# Patient Record
(demographics unavailable — no encounter records)

---

## 2024-11-22 NOTE — HISTORY OF PRESENT ILLNESS
[N] : Patient does not use contraception [Y] : Positive pregnancy history [Menarche Age: ____] : age at menarche was [unfilled] [No] : Patient does not have concerns regarding sex [Currently Active] : currently active [Men] : men [GonorrheaDate] : 10/18/2023 [TextBox_63] : neg [ChlamydiaDate] : 10/18/2023 [TextBox_68] : neg [LMPDate] : 10/3/2024 [PGHxTotal] : 4 [[facility] Center[facility] Center[facility]Term] : 3 [[facility] Phoenixiving] : 3 [PGHxABInduced] : 1 [FreeTextEntry1] : 10/3/2024

## 2024-11-22 NOTE — PHYSICAL EXAM
[Chaperone Present] : A chaperone was present in the examining room during all aspects of the physical examination [[id number]] : A chaperone was present during the pelvic exam. [Appropriately responsive] : appropriately responsive [Alert] : alert [No Acute Distress] : no acute distress [Oriented x3] : oriented x3 [Labia Majora] : normal [Labia Minora] : normal [Labia Minora Erythema] : erythema [Discharge] : a  ~M vaginal discharge was present [Moderate] : moderate [Mathew] : yellow [Cheesy] : cheesy [No Bleeding] : There was no active vaginal bleeding [Normal] : normal [Uterine Adnexae] : non-palpable [FreeTextEntry2] : JENELLE Weber

## 2024-11-22 NOTE — PLAN
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

## 2024-12-04 NOTE — PHYSICAL EXAM
[Chaperone Present] : A chaperone was present in the examining room during all aspects of the physical examination [[id number]] : A chaperone was present during the pelvic exam. [Appropriately responsive] : appropriately responsive [Alert] : alert [No Acute Distress] : no acute distress [Soft] : soft [Non-tender] : non-tender [Non-distended] : non-distended [No Mass] : no mass [Oriented x3] : oriented x3 [Examination Of The Breasts] : a normal appearance [] : multiple nodules were palpated [No Masses] : no breast masses were palpable [Labia Majora] : normal [Labia Minora] : normal [No Bleeding] : There was no active vaginal bleeding [Normal] : normal [Uterine Adnexae] : non-palpable [FreeTextEntry2] : JENELLE Malave [Tenderness] : nontender

## 2024-12-04 NOTE — REVIEW OF SYSTEMS
[Breast Pain] : breast pain [Breast Lump] : breast lump [Nipple Discharge] : no nipple discharge [Skin Lesion on Breast] : no skin lesion on breast [FreeTextEntry8] : pelvic pain with sex, recurrent vaginitis  [de-identified] : left lateral breast tenderness, "ball" with periods

## 2024-12-04 NOTE — PLAN
[FreeTextEntry1] : -F/u pap -Discussed diet and lifestyle management to reduce risk of worsening pre-diabetes, encouraged f/u with PCP, due for physical 1/2025 -Reviewed good vulvovaginal hygiene habits, possibility of long-term Fluconazole treatment if symptoms continue to be recurrent  -Rx mammogram/BUS, evaluate nodular tissue; discussed possibly cystic as pain occurs with menstrual period  -RTO for TVUS for pelvic pain with intercourse  -Routine physical activity encouraged -Call or RTO PRN for any new problems, questions or concerns

## 2024-12-04 NOTE — REASON FOR VISIT
[Annual] : an annual visit. [[employer] ] : provided by [employer]   [Interpreters_IDNumber] : [id number] [Interpreters_FullName] : Praveen

## 2024-12-04 NOTE — HISTORY OF PRESENT ILLNESS
[Y] : Positive pregnancy history [Menarche Age: ____] : age at menarche was [unfilled] [Patient reported mammogram was normal] : Patient reported mammogram was normal [No] : Patient does not have concerns regarding sex [Previously active] : previously active [Men] : men [Mammogramdate] : 2022 [TextBox_19] : Unsure where MAMMO was done  [PapSmeardate] : 2022 [TextBox_31] : Wnl as per patient  [LMPDate] : 11/30/24 [PGHxTotal] : 4 [[facility]FullTerm] : 4 [[facility] Hospitaliving] : 3 [FreeTextEntry1] : 11/30/24 [FreeTextEntry2] :

## 2025-01-24 NOTE — PHYSICAL EXAM
[Chaperone Present] : A chaperone was present in the examining room during all aspects of the physical examination [[id number]] : A chaperone was present during the pelvic exam. [Appropriately responsive] : appropriately responsive [Alert] : alert [No Acute Distress] : no acute distress [Oriented x3] : oriented x3 [Labia Majora] : normal [Labia Minora] : normal [Normal] : normal

## 2025-01-24 NOTE — DISCUSSION/SUMMARY
[FreeTextEntry1] : 45 y/o P3 LMP 1/21 with pelvic pain, small dermoid cyst, recurrent vaginitis, and symptoms possibly related to perimenopause but are overall non-specific  #Recurrent vaginitis - Vaginitis panel sent today to check for current infection - Given both BV and candida recurrence in past, recommended vaginal boric acid for prevention. Recommended nightly use for one month followed by weekly after that. Script sent to pharmacy today - Lotrisone also prescribed at patient's request as it has helped with external irritation prior. Reviewed only to use on vulva and not to insert into vagina.  #Pelvic pain - Reviewed no obvious cause of pain on ultrasound today (3.4 cm dermoid unlikely to cause pain). Reviewed however that pain does seem to be uterine in nature based on timing and description. Reviewed treatment options to control menses, and OCPs were selected. - Lo Loestrin prescribed, possible side effects reviewed.  #Possible perimenopausal symptoms - Reviewed that symptoms could be related to perimenopause, however, FSH testing will not help diagnose this unless patient is very near menopause and with menses being regular still this is unlikely - Discussed trial of E2 replacement could be informative as if it helps symptoms then it is likely that perimenopause is the cause. Discussed that combined OCPs being given to help with menses would assist with this as well, pt expressed understanding and is interested in trialing OCPs for this purposes as well  #Demoid cyst - Reviewed cyst, likely benign nature, small in size - F/u sono in 3 months  - Return in 3 months to check on OCP success and to f/u on sono  Earnest Porter MD

## 2025-01-24 NOTE — PHYSICAL EXAM
[Chaperone Present] : A chaperone was present in the examining room during all aspects of the physical examination [[id number]] : A chaperone was present during the pelvic exam. [Appropriately responsive] : appropriately responsive [Alert] : alert [No Acute Distress] : no acute distress [Oriented x3] : oriented x3 [Labia Majora] : normal [Labia Minora] : normal [Normal] : normal

## 2025-01-24 NOTE — DISCUSSION/SUMMARY
[FreeTextEntry1] : 43 y/o P3 LMP 1/21 with pelvic pain, small dermoid cyst, recurrent vaginitis, and symptoms possibly related to perimenopause but are overall non-specific  #Recurrent vaginitis - Vaginitis panel sent today to check for current infection - Given both BV and candida recurrence in past, recommended vaginal boric acid for prevention. Recommended nightly use for one month followed by weekly after that. Script sent to pharmacy today - Lotrisone also prescribed at patient's request as it has helped with external irritation prior. Reviewed only to use on vulva and not to insert into vagina.  #Pelvic pain - Reviewed no obvious cause of pain on ultrasound today (3.4 cm dermoid unlikely to cause pain). Reviewed however that pain does seem to be uterine in nature based on timing and description. Reviewed treatment options to control menses, and OCPs were selected. - Lo Loestrin prescribed, possible side effects reviewed.  #Possible perimenopausal symptoms - Reviewed that symptoms could be related to perimenopause, however, FSH testing will not help diagnose this unless patient is very near menopause and with menses being regular still this is unlikely - Discussed trial of E2 replacement could be informative as if it helps symptoms then it is likely that perimenopause is the cause. Discussed that combined OCPs being given to help with menses would assist with this as well, pt expressed understanding and is interested in trialing OCPs for this purposes as well  #Demoid cyst - Reviewed cyst, likely benign nature, small in size - F/u sono in 3 months  - Return in 3 months to check on OCP success and to f/u on sono  Earnest Porter MD

## 2025-01-24 NOTE — HISTORY OF PRESENT ILLNESS
[N] : Patient does not use contraception [Y] : Positive pregnancy history [Menarche Age: ____] : age at menarche was [unfilled] [No] : Patient does not have concerns regarding sex [Previously active] : previously active [Men] : men [PapSmeardate] : 12/04/2024 [TextBox_31] : NEG [HPVDate] : 12/04/2024 [TextBox_78] : NEG  [LMPDate] : 01/21/2025 [PGHxTotal] : 4 [[facility] Hospit[facility]] : 3 [[facility] Hospitaliving] : 3 [PGHxABInduced] : 1 [FreeTextEntry1] : 01/21/2025

## 2025-01-24 NOTE — HISTORY OF PRESENT ILLNESS
[N] : Patient does not use contraception [Y] : Positive pregnancy history [Menarche Age: ____] : age at menarche was [unfilled] [No] : Patient does not have concerns regarding sex [Previously active] : previously active [Men] : men [PapSmeardate] : 12/04/2024 [TextBox_31] : NEG [HPVDate] : 12/04/2024 [TextBox_78] : NEG  [LMPDate] : 01/21/2025 [PGHxTotal] : 4 [[facility] HospitallTerm] : 3 [[facility] Hospitaliving] : 3 [PGHxABInduced] : 1 [FreeTextEntry1] : 01/21/2025

## 2025-02-25 NOTE — HEALTH RISK ASSESSMENT
[Patient reported PAP Smear was normal] : Patient reported PAP Smear was normal [PapSmearDate] : 12/24

## 2025-02-25 NOTE — PAST MEDICAL HISTORY
[Menstruating] : menstruating [Definite ___ (Date)] : the last menstrual period was [unfilled] [Irregular Cycle Intervals] : are  irregular [Total Preg ___] : G[unfilled] [Live Births ___] : P[unfilled]  [AB Spont ___] : miscarriages: [unfilled]

## 2025-02-25 NOTE — HISTORY OF PRESENT ILLNESS
[FreeTextEntry1] :  HORTENSIA is a 44 year-old female here for a follow up. [de-identified] : CPE  as above negative  FAST no CP/SOB c activity no dizziness no  palpitations no N/V/D +BM qd NL no bloody/black stools  no acute change in bowel  habits no urinary complaints   Denies f/c/s no cough sense of smell/taste intact

## 2025-02-25 NOTE — PLAN
[FreeTextEntry1] : EKG performed: SR at 64 bpm, no ST/T changes   see lab orders   see radiology orders   f/u pending lab results

## 2025-02-25 NOTE — PHYSICAL EXAM
[No Acute Distress] : no acute distress [Well Nourished] : well nourished [Well Developed] : well developed [Well-Appearing] : well-appearing [EOMI] : extraocular movements intact [Normal Outer Ear/Nose] : the outer ears and nose were normal in appearance [No JVD] : no jugular venous distention [No Respiratory Distress] : no respiratory distress  [No Accessory Muscle Use] : no accessory muscle use [Clear to Auscultation] : lungs were clear to auscultation bilaterally [Normal Rate] : normal rate  [Regular Rhythm] : with a regular rhythm [Normal S1, S2] : normal S1 and S2 [No Carotid Bruits] : no carotid bruits [No Abdominal Bruit] : a ~M bruit was not heard ~T in the abdomen [No Varicosities] : no varicosities [No Edema] : there was no peripheral edema [No Palpable Aorta] : no palpable aorta [No Extremity Clubbing/Cyanosis] : no extremity clubbing/cyanosis [Soft] : abdomen soft [Non Tender] : non-tender [Non-distended] : non-distended [No Masses] : no abdominal mass palpated [No HSM] : no HSM [Normal Bowel Sounds] : normal bowel sounds [Normal Posterior Cervical Nodes] : no posterior cervical lymphadenopathy [Normal Anterior Cervical Nodes] : no anterior cervical lymphadenopathy [No CVA Tenderness] : no CVA  tenderness [Grossly Normal Strength/Tone] : grossly normal strength/tone [No Rash] : no rash [Coordination Grossly Intact] : coordination grossly intact [No Focal Deficits] : no focal deficits [Normal Gait] : normal gait [Normal Affect] : the affect was normal [Normal Mood] : the mood was normal [Normal Insight/Judgement] : insight and judgment were intact

## 2025-04-27 NOTE — HISTORY OF PRESENT ILLNESS
[N] : Patient does not use contraception [Y] : Positive pregnancy history [Menarche Age: ____] : age at menarche was [unfilled] [Mammogramdate] : 04/02/25 [TextBox_19] : BR 1 [PapSmeardate] : 12/04/24 [TextBox_31] : neg [GonorrheaDate] : 10/18/23 [TextBox_63] : neg [ChlamydiaDate] : 10/18/23 [TextBox_68] : neg [HPVDate] : 12/04/24 [TextBox_78] : neg [LMPDate] : 04/17/25 [PGHxTotal] : 4 [[facility]allTerm] : 3 [[facility] Centeriving] : 3 [PGHxABInduced] : 1 [FreeTextEntry1] : 04/17/25

## 2025-04-27 NOTE — DISCUSSION/SUMMARY
[FreeTextEntry1] : 44 y/o P3 LMP 4/17 presenting for follow up of pelvic pain, small dermoid cyst, and recurrent vaginitis. All symptoms improved at this time, dermoid stable on sono.  #Recurrent vaginitis - Can continue to monitor symptoms, pt not interested in suppression at this time after discussion  #Pelvic pain/painful menses - Reviewed option of restarting Lo Loestrin as symptoms she experienced in the 3rd month were likely unrelated given first 2 months were fine. Pt feels well off of OCPs for now though, so she wishes to continue to observe off of medication.  #Demoid cyst - Reviewed cyst stable in size - Can monitor at annual visits  - Return for annual visit or as needed  Earnest Porter MD.

## 2025-04-27 NOTE — HISTORY OF PRESENT ILLNESS
[N] : Patient does not use contraception [Y] : Positive pregnancy history [Menarche Age: ____] : age at menarche was [unfilled] [Mammogramdate] : 04/02/25 [TextBox_19] : BR 1 [PapSmeardate] : 12/04/24 [TextBox_31] : neg [GonorrheaDate] : 10/18/23 [TextBox_63] : neg [ChlamydiaDate] : 10/18/23 [TextBox_68] : neg [HPVDate] : 12/04/24 [TextBox_78] : neg [LMPDate] : 04/17/25 [PGHxTotal] : 4 [[facility]allTerm] : 3 [[facility]iving] : 3 [PGHxABInduced] : 1 [FreeTextEntry1] : 04/17/25

## 2025-04-27 NOTE — DISCUSSION/SUMMARY
[FreeTextEntry1] : 46 y/o P3 LMP 4/17 presenting for follow up of pelvic pain, small dermoid cyst, and recurrent vaginitis. All symptoms improved at this time, dermoid stable on sono.  #Recurrent vaginitis - Can continue to monitor symptoms, pt not interested in suppression at this time after discussion  #Pelvic pain/painful menses - Reviewed option of restarting Lo Loestrin as symptoms she experienced in the 3rd month were likely unrelated given first 2 months were fine. Pt feels well off of OCPs for now though, so she wishes to continue to observe off of medication.  #Demoid cyst - Reviewed cyst stable in size - Can monitor at annual visits  - Return for annual visit or as needed  Earnest Porter MD.